# Patient Record
Sex: MALE | Race: WHITE | HISPANIC OR LATINO | URBAN - METROPOLITAN AREA
[De-identification: names, ages, dates, MRNs, and addresses within clinical notes are randomized per-mention and may not be internally consistent; named-entity substitution may affect disease eponyms.]

---

## 2018-06-20 ENCOUNTER — NEW PATIENT (OUTPATIENT)
Dept: URBAN - METROPOLITAN AREA CLINIC 64 | Facility: CLINIC | Age: 43
End: 2018-06-20
Payer: COMMERCIAL

## 2018-06-20 PROCEDURE — 92004 COMPRE OPH EXAM NEW PT 1/>: CPT | Performed by: OPHTHALMOLOGY

## 2018-06-20 ASSESSMENT — INTRAOCULAR PRESSURE
OD: 11
OS: 11

## 2018-06-20 ASSESSMENT — VISUAL ACUITY
OD: 20/20
OS: 20/20

## 2018-06-26 ENCOUNTER — Encounter (OUTPATIENT)
Dept: URBAN - METROPOLITAN AREA CLINIC 64 | Facility: CLINIC | Age: 43
End: 2018-06-26
Payer: COMMERCIAL

## 2018-06-26 DIAGNOSIS — H11.011 AMYLOID PTERYGIUM OF RIGHT EYE: ICD-10-CM

## 2018-06-26 DIAGNOSIS — Z01.818 ENCOUNTER FOR OTHER PREPROCEDURAL EXAMINATION: Primary | ICD-10-CM

## 2018-06-26 PROCEDURE — 99213 OFFICE O/P EST LOW 20 MIN: CPT | Performed by: NURSE PRACTITIONER

## 2018-07-12 ENCOUNTER — SURGERY (OUTPATIENT)
Dept: URBAN - METROPOLITAN AREA SURGERY 42 | Facility: SURGERY | Age: 43
End: 2018-07-12
Payer: COMMERCIAL

## 2018-07-12 PROCEDURE — 65426 REMOVAL OF EYE LESION: CPT | Performed by: OPHTHALMOLOGY

## 2018-07-18 ENCOUNTER — POST OP (OUTPATIENT)
Dept: URBAN - METROPOLITAN AREA CLINIC 64 | Facility: CLINIC | Age: 43
End: 2018-07-18

## 2018-07-18 PROCEDURE — 99024 POSTOP FOLLOW-UP VISIT: CPT | Performed by: OPTOMETRIST

## 2018-07-18 RX ORDER — NEOMYCIN SULFATE, POLYMYXIN B SULFATE AND DEXAMETHASONE 3.5; 10000; 1 MG/G; [USP'U]/G; MG/G
OINTMENT OPHTHALMIC
Qty: 1 | Refills: 1 | Status: INACTIVE
Start: 2018-07-18 | End: 2018-09-17

## 2018-07-18 ASSESSMENT — INTRAOCULAR PRESSURE
OD: 14
OS: 14

## 2018-08-03 ENCOUNTER — Encounter (OUTPATIENT)
Dept: URBAN - METROPOLITAN AREA CLINIC 64 | Facility: CLINIC | Age: 43
End: 2018-08-03
Payer: COMMERCIAL

## 2018-08-03 DIAGNOSIS — H11.012 AMYLOID PTERYGIUM OF LEFT EYE: ICD-10-CM

## 2018-08-03 PROCEDURE — 99213 OFFICE O/P EST LOW 20 MIN: CPT | Performed by: NURSE PRACTITIONER

## 2018-08-23 ENCOUNTER — SURGERY (OUTPATIENT)
Dept: URBAN - METROPOLITAN AREA SURGERY 42 | Facility: SURGERY | Age: 43
End: 2018-08-23
Payer: COMMERCIAL

## 2018-08-23 PROCEDURE — 65426 REMOVAL OF EYE LESION: CPT | Performed by: OPHTHALMOLOGY

## 2018-08-30 ENCOUNTER — POST OP (OUTPATIENT)
Dept: URBAN - METROPOLITAN AREA CLINIC 64 | Facility: CLINIC | Age: 43
End: 2018-08-30

## 2018-08-30 DIAGNOSIS — Z96.1 PRESENCE OF INTRAOCULAR LENS: Primary | ICD-10-CM

## 2018-08-30 PROCEDURE — 99024 POSTOP FOLLOW-UP VISIT: CPT | Performed by: OPTOMETRIST

## 2018-09-17 ENCOUNTER — POST OP (OUTPATIENT)
Dept: URBAN - METROPOLITAN AREA CLINIC 64 | Facility: CLINIC | Age: 43
End: 2018-09-17

## 2018-09-17 DIAGNOSIS — H11.013 AMYLOID PTERYGIUM OF EYE, BILATERAL: Primary | ICD-10-CM

## 2018-09-17 PROCEDURE — 99024 POSTOP FOLLOW-UP VISIT: CPT | Performed by: OPHTHALMOLOGY

## 2018-09-17 RX ORDER — NEOMYCIN SULFATE, POLYMYXIN B SULFATE AND DEXAMETHASONE 3.5; 10000; 1 MG/ML; [USP'U]/ML; MG/ML
SUSPENSION OPHTHALMIC
Qty: 1 | Refills: 1 | Status: INACTIVE
Start: 2018-09-17 | End: 2021-11-01

## 2018-09-17 ASSESSMENT — INTRAOCULAR PRESSURE
OS: 14
OD: 13

## 2018-10-08 ENCOUNTER — POST OP (OUTPATIENT)
Dept: URBAN - METROPOLITAN AREA CLINIC 64 | Facility: CLINIC | Age: 43
End: 2018-10-08

## 2018-10-08 PROCEDURE — 99024 POSTOP FOLLOW-UP VISIT: CPT | Performed by: OPTOMETRIST

## 2018-10-08 ASSESSMENT — INTRAOCULAR PRESSURE
OS: 27
OD: 18

## 2018-10-10 ENCOUNTER — POST OP (OUTPATIENT)
Dept: URBAN - METROPOLITAN AREA CLINIC 64 | Facility: CLINIC | Age: 43
End: 2018-10-10

## 2018-10-10 PROCEDURE — 99024 POSTOP FOLLOW-UP VISIT: CPT | Performed by: OPTOMETRIST

## 2018-10-15 ENCOUNTER — POST OP (OUTPATIENT)
Dept: URBAN - METROPOLITAN AREA CLINIC 64 | Facility: CLINIC | Age: 43
End: 2018-10-15

## 2018-10-15 PROCEDURE — 99024 POSTOP FOLLOW-UP VISIT: CPT | Performed by: OPHTHALMOLOGY

## 2018-10-15 ASSESSMENT — INTRAOCULAR PRESSURE
OS: 15
OD: 13

## 2018-10-24 ENCOUNTER — FOLLOW UP ESTABLISHED (OUTPATIENT)
Dept: URBAN - METROPOLITAN AREA CLINIC 64 | Facility: CLINIC | Age: 43
End: 2018-10-24
Payer: COMMERCIAL

## 2018-10-24 DIAGNOSIS — H11.222 CONJUNCTIVAL GRANULOMA, LEFT EYE: Primary | ICD-10-CM

## 2018-10-24 DIAGNOSIS — L98.0 PYOGENIC GRANULOMA: ICD-10-CM

## 2018-10-24 PROCEDURE — 68399 UNLISTED PX CONJUNCTIVA: CPT | Performed by: OPHTHALMOLOGY

## 2018-10-24 PROCEDURE — 92012 INTRM OPH EXAM EST PATIENT: CPT | Performed by: OPHTHALMOLOGY

## 2018-10-24 RX ORDER — PREDNISOLONE ACETATE 10 MG/ML
1 % SUSPENSION/ DROPS OPHTHALMIC
Qty: 1 | Refills: 0 | Status: INACTIVE
Start: 2018-10-24 | End: 2021-11-01

## 2018-11-14 ENCOUNTER — FOLLOW UP ESTABLISHED (OUTPATIENT)
Dept: URBAN - METROPOLITAN AREA CLINIC 64 | Facility: CLINIC | Age: 43
End: 2018-11-14
Payer: COMMERCIAL

## 2018-11-14 PROCEDURE — 99213 OFFICE O/P EST LOW 20 MIN: CPT | Performed by: OPHTHALMOLOGY

## 2018-11-14 ASSESSMENT — VISUAL ACUITY
OS: 20/25
OD: 20/20

## 2018-11-14 ASSESSMENT — INTRAOCULAR PRESSURE
OD: 23
OS: 21

## 2021-11-01 ENCOUNTER — OFFICE VISIT (OUTPATIENT)
Dept: URBAN - METROPOLITAN AREA CLINIC 64 | Facility: CLINIC | Age: 46
End: 2021-11-01
Payer: COMMERCIAL

## 2021-11-01 DIAGNOSIS — H52.223 REGULAR ASTIGMATISM, BILATERAL: Primary | ICD-10-CM

## 2021-11-01 PROCEDURE — 92004 COMPRE OPH EXAM NEW PT 1/>: CPT | Performed by: OPTOMETRIST

## 2021-11-01 ASSESSMENT — INTRAOCULAR PRESSURE
OD: 10
OS: 10

## 2021-11-01 ASSESSMENT — KERATOMETRY
OS: 39.38
OD: 40.10

## 2021-11-01 NOTE — IMPRESSION/PLAN
Impression: Regular astigmatism, bilateral: H52.223. Plan: Updated glasses Rx. He is ready to try multifocals.

## 2022-12-14 ENCOUNTER — OFFICE VISIT (OUTPATIENT)
Dept: URBAN - METROPOLITAN AREA CLINIC 64 | Facility: CLINIC | Age: 47
End: 2022-12-14
Payer: COMMERCIAL

## 2022-12-14 DIAGNOSIS — H52.223 REGULAR ASTIGMATISM, BILATERAL: Primary | ICD-10-CM

## 2022-12-14 DIAGNOSIS — H52.4 PRESBYOPIA: ICD-10-CM

## 2022-12-14 DIAGNOSIS — H11.013 AMYLOID PTERYGIUM, BILATERAL: ICD-10-CM

## 2022-12-14 PROCEDURE — 92014 COMPRE OPH EXAM EST PT 1/>: CPT

## 2022-12-14 ASSESSMENT — KERATOMETRY
OD: 39.82
OS: 39.26

## 2022-12-14 ASSESSMENT — INTRAOCULAR PRESSURE
OS: 13
OD: 16

## 2022-12-14 ASSESSMENT — VISUAL ACUITY
OD: 20/20
OS: 20/20

## 2022-12-14 NOTE — IMPRESSION/PLAN
Impression: Amyloid pterygium, bilateral: H11.013. Plan: Patient educated on condition. Recommend AT's QID and UV protection. Monitor.

## 2022-12-14 NOTE — IMPRESSION/PLAN
Impression: Regular astigmatism, bilateral: H52.223. Plan: Patient educated. Updated SRx released to patient.  Monitor yearly

## 2023-12-14 ENCOUNTER — OFFICE VISIT (OUTPATIENT)
Dept: URBAN - METROPOLITAN AREA CLINIC 64 | Facility: LOCATION | Age: 48
End: 2023-12-14
Payer: COMMERCIAL

## 2023-12-14 DIAGNOSIS — H11.013 AMYLOID PTERYGIUM, BILATERAL: ICD-10-CM

## 2023-12-14 DIAGNOSIS — H52.4 PRESBYOPIA: Primary | ICD-10-CM

## 2023-12-14 PROCEDURE — 92014 COMPRE OPH EXAM EST PT 1/>: CPT | Performed by: OPTOMETRIST

## 2023-12-14 ASSESSMENT — VISUAL ACUITY
OD: 20/20
OS: 20/20

## 2023-12-14 ASSESSMENT — INTRAOCULAR PRESSURE
OD: 13
OS: 13